# Patient Record
Sex: FEMALE | Race: WHITE | URBAN - METROPOLITAN AREA
[De-identification: names, ages, dates, MRNs, and addresses within clinical notes are randomized per-mention and may not be internally consistent; named-entity substitution may affect disease eponyms.]

---

## 2017-01-03 ENCOUNTER — IMPORTED ENCOUNTER (OUTPATIENT)
Dept: URBAN - METROPOLITAN AREA CLINIC 50 | Facility: CLINIC | Age: 72
End: 2017-01-03

## 2017-05-02 ENCOUNTER — IMPORTED ENCOUNTER (OUTPATIENT)
Dept: URBAN - METROPOLITAN AREA CLINIC 50 | Facility: CLINIC | Age: 72
End: 2017-05-02

## 2018-05-08 ENCOUNTER — IMPORTED ENCOUNTER (OUTPATIENT)
Dept: URBAN - METROPOLITAN AREA CLINIC 50 | Facility: CLINIC | Age: 73
End: 2018-05-08

## 2019-06-03 ENCOUNTER — IMPORTED ENCOUNTER (OUTPATIENT)
Dept: URBAN - METROPOLITAN AREA CLINIC 50 | Facility: CLINIC | Age: 74
End: 2019-06-03

## 2019-06-07 ENCOUNTER — IMPORTED ENCOUNTER (OUTPATIENT)
Dept: URBAN - METROPOLITAN AREA CLINIC 50 | Facility: CLINIC | Age: 74
End: 2019-06-07

## 2019-06-10 ENCOUNTER — IMPORTED ENCOUNTER (OUTPATIENT)
Dept: URBAN - METROPOLITAN AREA CLINIC 50 | Facility: CLINIC | Age: 74
End: 2019-06-10

## 2019-06-20 ENCOUNTER — IMPORTED ENCOUNTER (OUTPATIENT)
Dept: URBAN - METROPOLITAN AREA CLINIC 50 | Facility: CLINIC | Age: 74
End: 2019-06-20

## 2020-06-23 ENCOUNTER — IMPORTED ENCOUNTER (OUTPATIENT)
Dept: URBAN - METROPOLITAN AREA CLINIC 50 | Facility: CLINIC | Age: 75
End: 2020-06-23

## 2020-07-13 ENCOUNTER — IMPORTED ENCOUNTER (OUTPATIENT)
Dept: URBAN - METROPOLITAN AREA CLINIC 50 | Facility: CLINIC | Age: 75
End: 2020-07-13

## 2020-07-29 ENCOUNTER — IMPORTED ENCOUNTER (OUTPATIENT)
Dept: URBAN - METROPOLITAN AREA CLINIC 50 | Facility: CLINIC | Age: 75
End: 2020-07-29

## 2021-04-16 NOTE — PATIENT DISCUSSION
"""Call if vision decreases or RD warning signs increases/RD warnings given.  No signs of right eye retinal tear
"""Informed patient that their capsular opacification is not visually significant or does not meet the minimum criteria for capsulotomy.  Recommended attention to PCO symptoms
normal...

## 2021-04-17 ASSESSMENT — TONOMETRY
OS_IOP_MMHG: 12
OS_IOP_MMHG: 16
OS_IOP_MMHG: 12
OD_IOP_MMHG: 12
OS_IOP_MMHG: 13
OS_IOP_MMHG: 13
OD_IOP_MMHG: 13
OS_IOP_MMHG: 17
OD_IOP_MMHG: 13
OS_IOP_MMHG: 13
OD_IOP_MMHG: 13
OD_IOP_MMHG: 16
OD_IOP_MMHG: 12
OS_IOP_MMHG: 13
OS_IOP_MMHG: 16
OD_IOP_MMHG: 16

## 2021-04-17 ASSESSMENT — VISUAL ACUITY
OD_CC: J2@ 15 IN
OS_CC: J1@ 16 IN
OS_SC: 20/25-1
OS_SC: 20/20
OS_SC: 20/20
OD_CC: J1NEAR
OD_PH: @ 16 IN
OD_SC: 20/20-2
OS_OTHER: 20/25. 20/30.
OD_SC: 20/20-2
OD_SC: 20/20-1
OD_CC: J2@ 14 IN
OS_PH: @ 16 IN
OD_CC: J1@ 16 IN
OD_SC: 20/20
OD_PH: @ 16 IN
OS_SC: 20/25-
OS_SC: 20/30
OS_SC: 20/25+2
OS_SC: 20/20-2
OS_OTHER: 20/25. 20/30.
OS_CC: J1@ 15 IN
OD_SC: 20/25-
OS_CC: J1NEAR
OS_BAT: 20/25
OS_SC: 20/20
OD_CC: J1@ 16 IN
OS_PH: @ 16 IN
OD_OTHER: 20/30. 20/60.
OS_BAT: 20/25
OD_OTHER: 20/25. 20/30.
OS_CC: J1@ 16 IN
OD_SC: 20/20-2
OD_BAT: 20/30
OD_SC: 20/30
OD_CC: J1@ 15 IN
OD_BAT: 20/25
OD_SC: 20/25+1
OS_OTHER: 20/25. 20/40.
OD_SC: 20/20
OS_CC: J2@ 14 IN
OS_BAT: 20/25
OS_CC: J2@ 15 IN
OS_SC: 20/20

## 2021-07-24 ENCOUNTER — PREPPED CHART (OUTPATIENT)
Dept: URBAN - METROPOLITAN AREA CLINIC 49 | Facility: CLINIC | Age: 76
End: 2021-07-24

## 2021-07-26 ENCOUNTER — COMPREHENSIVE EXAM (OUTPATIENT)
Dept: URBAN - METROPOLITAN AREA CLINIC 49 | Facility: CLINIC | Age: 76
End: 2021-07-26

## 2021-07-26 DIAGNOSIS — H16.223: ICD-10-CM

## 2021-07-26 DIAGNOSIS — H43.813: ICD-10-CM

## 2021-07-26 DIAGNOSIS — H35.373: ICD-10-CM

## 2021-07-26 PROCEDURE — 92014 COMPRE OPH EXAM EST PT 1/>: CPT

## 2021-07-26 PROCEDURE — 92134 CPTRZ OPH DX IMG PST SGM RTA: CPT

## 2021-07-26 ASSESSMENT — VISUAL ACUITY
OS_SC: 20/20
OU_SC: J1
OU_SC: 20/20
OD_SC: 20/20

## 2021-07-26 ASSESSMENT — TONOMETRY
OS_IOP_MMHG: 12
OD_IOP_MMHG: 11

## 2022-07-21 ENCOUNTER — COMPREHENSIVE EXAM (OUTPATIENT)
Dept: URBAN - METROPOLITAN AREA CLINIC 53 | Facility: CLINIC | Age: 77
End: 2022-07-21

## 2022-07-21 DIAGNOSIS — H35.373: ICD-10-CM

## 2022-07-21 DIAGNOSIS — H43.811: ICD-10-CM

## 2022-07-21 DIAGNOSIS — H16.223: ICD-10-CM

## 2022-07-21 PROCEDURE — 92014 COMPRE OPH EXAM EST PT 1/>: CPT

## 2022-07-21 PROCEDURE — 92134 CPTRZ OPH DX IMG PST SGM RTA: CPT

## 2022-07-21 ASSESSMENT — VISUAL ACUITY
OD_SC: 20/20
OS_SC: 20/25
OU_SC: J1+@14
OU_CC: J1+@16

## 2022-07-21 ASSESSMENT — TONOMETRY
OS_IOP_MMHG: 13
OD_IOP_MMHG: 14

## 2023-02-15 NOTE — PATIENT DISCUSSION
"""Continue Artificial tears both eyes BID - TID ."""
"""Informed patient that their capsular opacification OD is visually significant and meets the ""
Detail Level: Detailed
Quality 130: Documentation Of Current Medications In The Medical Record: Current Medications Documented

## 2023-07-20 ENCOUNTER — COMPREHENSIVE EXAM (OUTPATIENT)
Dept: URBAN - METROPOLITAN AREA CLINIC 53 | Facility: CLINIC | Age: 78
End: 2023-07-20

## 2023-07-20 DIAGNOSIS — H43.811: ICD-10-CM

## 2023-07-20 DIAGNOSIS — H35.373: ICD-10-CM

## 2023-07-20 DIAGNOSIS — H16.223: ICD-10-CM

## 2023-07-20 PROCEDURE — 99212 OFFICE O/P EST SF 10 MIN: CPT

## 2023-07-20 PROCEDURE — 92134 CPTRZ OPH DX IMG PST SGM RTA: CPT

## 2023-07-20 RX ORDER — LIFITEGRAST 50 MG/ML: 1 SOLUTION/ DROPS OPHTHALMIC TWICE A DAY

## 2023-07-20 RX ORDER — FLUOROMETHOLONE 1 MG/ML: 1 SOLUTION/ DROPS OPHTHALMIC TWICE A DAY

## 2023-07-20 ASSESSMENT — VISUAL ACUITY
OD_SC: 20/20
OU_SC: 20/20"16IN
OS_SC: 20/20
OU_SC: 20/20

## 2023-07-20 ASSESSMENT — TONOMETRY
OD_IOP_MMHG: 15
OS_IOP_MMHG: 14

## 2023-07-20 ASSESSMENT — KERATOMETRY
OS_K1POWER_DIOPTERS: 43.25
OS_K2POWER_DIOPTERS: 43.75
OD_K1POWER_DIOPTERS: 43.50
OS_AXISANGLE_DEGREES: 121
OD_AXISANGLE2_DEGREES: 30
OD_K2POWER_DIOPTERS: 43.75
OD_AXISANGLE_DEGREES: 120
OS_AXISANGLE2_DEGREES: 31

## 2023-09-14 ENCOUNTER — FOLLOW UP (OUTPATIENT)
Dept: URBAN - METROPOLITAN AREA CLINIC 53 | Facility: CLINIC | Age: 78
End: 2023-09-14

## 2023-09-14 DIAGNOSIS — H16.223: ICD-10-CM

## 2023-09-14 PROCEDURE — 92012 INTRM OPH EXAM EST PATIENT: CPT

## 2023-09-14 ASSESSMENT — TONOMETRY
OS_IOP_MMHG: 12
OD_IOP_MMHG: 12

## 2023-09-14 ASSESSMENT — KERATOMETRY
OS_AXISANGLE_DEGREES: 121
OD_K1POWER_DIOPTERS: 43.50
OD_AXISANGLE_DEGREES: 120
OD_AXISANGLE2_DEGREES: 30
OS_K2POWER_DIOPTERS: 43.75
OS_K1POWER_DIOPTERS: 43.25
OS_AXISANGLE2_DEGREES: 31
OD_K2POWER_DIOPTERS: 43.75

## 2023-09-14 ASSESSMENT — VISUAL ACUITY
OS_SC: 20/20-2
OD_SC: 20/20-2

## 2024-06-13 ENCOUNTER — COMPREHENSIVE EXAM (OUTPATIENT)
Dept: URBAN - METROPOLITAN AREA CLINIC 53 | Facility: CLINIC | Age: 79
End: 2024-06-13

## 2024-06-13 DIAGNOSIS — H52.4: ICD-10-CM

## 2024-06-13 DIAGNOSIS — H16.223: ICD-10-CM

## 2024-06-13 DIAGNOSIS — H35.373: ICD-10-CM

## 2024-06-13 DIAGNOSIS — H43.811: ICD-10-CM

## 2024-06-13 PROCEDURE — 92134 CPTRZ OPH DX IMG PST SGM RTA: CPT

## 2024-06-13 PROCEDURE — 99214 OFFICE O/P EST MOD 30 MIN: CPT

## 2024-06-13 PROCEDURE — 92015 DETERMINE REFRACTIVE STATE: CPT

## 2024-06-13 ASSESSMENT — KERATOMETRY
OD_K2POWER_DIOPTERS: 43.75
OD_AXISANGLE2_DEGREES: 30
OS_K1POWER_DIOPTERS: 43.25
OS_AXISANGLE2_DEGREES: 31
OD_K1POWER_DIOPTERS: 43.50
OS_AXISANGLE_DEGREES: 121
OD_AXISANGLE_DEGREES: 120
OS_K2POWER_DIOPTERS: 43.75

## 2024-06-13 ASSESSMENT — VISUAL ACUITY
OU_SC: J1
OU_SC: 20/20
OS_SC: 20/20-1
OD_SC: 20/25
OU_CC: J1+ @ 14"

## 2024-06-13 ASSESSMENT — TONOMETRY
OD_IOP_MMHG: 13
OS_IOP_MMHG: 15

## 2025-07-02 ENCOUNTER — COMPREHENSIVE EXAM (OUTPATIENT)
Age: 80
End: 2025-07-02

## 2025-07-02 DIAGNOSIS — H16.223: ICD-10-CM

## 2025-07-02 DIAGNOSIS — H35.373: ICD-10-CM

## 2025-07-02 DIAGNOSIS — H43.811: ICD-10-CM

## 2025-07-02 PROCEDURE — 92134 CPTRZ OPH DX IMG PST SGM RTA: CPT

## 2025-07-02 PROCEDURE — 99214 OFFICE O/P EST MOD 30 MIN: CPT
